# Patient Record
Sex: MALE | Race: WHITE | NOT HISPANIC OR LATINO | Employment: FULL TIME | URBAN - METROPOLITAN AREA
[De-identification: names, ages, dates, MRNs, and addresses within clinical notes are randomized per-mention and may not be internally consistent; named-entity substitution may affect disease eponyms.]

---

## 2020-12-01 ENCOUNTER — AMBULATORY - HEALTHEAST (OUTPATIENT)
Dept: FAMILY MEDICINE | Facility: CLINIC | Age: 50
End: 2020-12-01

## 2020-12-01 ENCOUNTER — VIRTUAL VISIT (OUTPATIENT)
Dept: FAMILY MEDICINE | Facility: OTHER | Age: 50
End: 2020-12-01

## 2020-12-01 DIAGNOSIS — Z20.822 SUSPECTED 2019 NOVEL CORONAVIRUS INFECTION: ICD-10-CM

## 2020-12-02 ENCOUNTER — AMBULATORY - HEALTHEAST (OUTPATIENT)
Dept: FAMILY MEDICINE | Facility: CLINIC | Age: 50
End: 2020-12-02

## 2020-12-02 DIAGNOSIS — Z20.822 SUSPECTED 2019 NOVEL CORONAVIRUS INFECTION: ICD-10-CM

## 2020-12-04 ENCOUNTER — COMMUNICATION - HEALTHEAST (OUTPATIENT)
Dept: SCHEDULING | Facility: CLINIC | Age: 50
End: 2020-12-04

## 2021-05-25 ENCOUNTER — RECORDS - HEALTHEAST (OUTPATIENT)
Dept: ADMINISTRATIVE | Facility: CLINIC | Age: 51
End: 2021-05-25

## 2023-01-14 ENCOUNTER — APPOINTMENT (OUTPATIENT)
Dept: RADIOLOGY | Facility: CLINIC | Age: 53
End: 2023-01-14
Attending: PHYSICIAN ASSISTANT
Payer: OTHER MISCELLANEOUS

## 2023-01-14 ENCOUNTER — HOSPITAL ENCOUNTER (EMERGENCY)
Facility: CLINIC | Age: 53
Discharge: HOME | End: 2023-01-14
Admitting: PHYSICIAN ASSISTANT
Payer: OTHER MISCELLANEOUS

## 2023-01-14 VITALS
WEIGHT: 240 LBS | TEMPERATURE: 97.7 F | DIASTOLIC BLOOD PRESSURE: 77 MMHG | HEART RATE: 64 BPM | SYSTOLIC BLOOD PRESSURE: 134 MMHG | HEIGHT: 72 IN | BODY MASS INDEX: 32.51 KG/M2 | RESPIRATION RATE: 16 BRPM | OXYGEN SATURATION: 97 %

## 2023-01-14 DIAGNOSIS — S49.91XA SHOULDER INJURY, RIGHT, INITIAL ENCOUNTER: ICD-10-CM

## 2023-01-14 PROCEDURE — 73030 X-RAY EXAM OF SHOULDER: CPT | Mod: RT

## 2023-01-14 PROCEDURE — 99283 EMERGENCY DEPT VISIT LOW MDM: CPT

## 2023-01-14 ASSESSMENT — ENCOUNTER SYMPTOMS
NEUROLOGICAL NEGATIVE: 1
GASTROINTESTINAL NEGATIVE: 1
CARDIOVASCULAR NEGATIVE: 1
RESPIRATORY NEGATIVE: 1

## 2023-01-14 NOTE — Clinical Note
Avi Castro was seen and treated in our emergency department on 1/14/2023.  He may return to work on 01/23/2023.  Currently your next work date is Wednesday the 18th, however if you still are having persistent pain and do not feel that he can you can do your job please take my recommendation is for excusing him until the 23rd.     If you have any questions or concerns, please don't hesitate to call.      Cyril Myrick, JARAD

## 2023-01-14 NOTE — ED PROVIDER NOTES
EMERGENCY DEPARTMENT ENCOUNTER      NAME: Avi Castro  AGE: 52 year old male  YOB: 1970  MRN: [id number]  EVALUATION DATE & TIME: No admission date for patient encounter.    PCP: Mushtaq Purvis    ED PROVIDER: Cyril Myrick PA-C      Chief Complaint   Patient presents with     Shoulder Injury     Right          FINAL IMPRESSION:  1. Shoulder injury, right, initial encounter          MEDICAL DECISION MAKING:    Pertinent Labs & Imaging studies reviewed. (See chart for details)  52 year old male presents to the Emergency Department for evaluation of right shoulder pain after injury.    After obtaining history present illness, reviewing vital signs and examining the patient plan to screen with an x-ray.  I suspect soft tissue injury.    Medical Decision Making    History:    Supplemental history from: Documented in HPI, if applicable    External Record(s) reviewed: Documented in HPI, if applicable.    Work Up:    Chart documentation includes differential considered and any EKGs or imaging independently interpreted by provider.    In additional to work up documented, I considered the following work up: See chart documentation, if applicable.    External consultation:    Discussion of management with another provider: See chart documentation, if applicable    Complicating factors:    Care impacted by chronic illness: N/A    Care affected by social determinants of health: N/A    Disposition considerations: Discharge. No recommendations on prescription strength medication(s). N/A.        X-rays negative for fracture or dislocation.  Patient's pain is likely related to cartilage versus tendon or ligament issue.  Recommend conservative management the form of immobilization ice and NSAIDs.  If the symptoms are persisting greater than 2 weeks I recommend follow-up through an orthopedic specialist where they likely would consider outpatient MRI.  Overall patient agreeable to plan of care.  I did provide  the patient a work note.  He is not scheduled to go back to work until Wednesday, however I did write for extended period of time off if he still feels that he cannot perform his job as he works as a .  Overall patient agreeable with plan.    ED COURSE    I met with the patient, obtained history, performed an initial exam, and discussed options and plan for diagnostics and treatment here in the ED.    At the conclusion of the encounter I discussed the results of all of the tests and the disposition. The questions were answered. The patient or family acknowledged understanding and was agreeable with the care plan.     MEDICATIONS GIVEN IN THE EMERGENCY:  Medications - No data to display    NEW PRESCRIPTIONS STARTED AT TODAY'S ER VISIT  New Prescriptions    No medications on file            =================================================================    HPI    Patient information was obtained from: Patient  Avi Castro is a 52 year old male who presents to this ED for evaluation of right shoulder pain.  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  He was walking down the driveway and there was a very small piece of ice that he slipped on causing him to fall towards his back landing on his right shoulder and right arm.  He did have instant pain but he was able to finish his shift and since then he has been using ibuprofen Tylenol and ice to treat symptoms.  He is continue to have pain most with attempts to lift his arm up over his head.  He does describe some radiating pain from right shoulder down into the aspects of the arm closer to the elbow.  He denies any numbness tingling or weakness in the right hand.  No complaints of neck pain.  He did not hit his head and he did not lose consciousness.  He denies any acute chest pain or shortness of breath.  He denies any coughing up blood, or urinating blood.      REVIEW OF SYSTEMS   Review  of Systems   Respiratory: Negative.    Cardiovascular: Negative.    Gastrointestinal: Negative.    Genitourinary: Negative.    Musculoskeletal:        Pain in the right shoulder   Neurological: Negative.    All other systems reviewed and are negative.         PAST MEDICAL HISTORY:  No past medical history on file.    PAST SURGICAL HISTORY:  No past surgical history on file.      CURRENT MEDICATIONS:    No current facility-administered medications for this encounter.  No current outpatient medications on file.      ALLERGIES:  No Known Allergies    FAMILY HISTORY:  No family history on file.    SOCIAL HISTORY:   Social History     Socioeconomic History     Marital status:        VITALS:  Patient Vitals for the past 24 hrs:   BP Temp Temp src Pulse Resp SpO2 Height Weight   01/14/23 1325 134/77 97.7  F (36.5  C) Oral 64 16 97 % 1.829 m (6') 108.9 kg (240 lb)       PHYSICAL EXAM    Physical Exam  Vitals and nursing note reviewed.   Constitutional:       General: He is not in acute distress.     Appearance: He is normal weight. He is not ill-appearing.   HENT:      Head: Normocephalic.      Right Ear: External ear normal.      Left Ear: External ear normal.      Nose: Nose normal.   Eyes:      Conjunctiva/sclera: Conjunctivae normal.   Cardiovascular:      Rate and Rhythm: Normal rate.      Pulses: Normal pulses.   Pulmonary:      Effort: Pulmonary effort is normal. No respiratory distress.   Musculoskeletal:         General: Normal range of motion.      Cervical back: Normal range of motion.      Comments: Patient localizes pain to the general right shoulder area.  No palpable deformity.  There is no tenderness to palpation of the right clavicle or right scapula.  Patient is able to flex and extend at the right elbow as well as right wrist without severe pain.  Distal radial ulnar pulses intact involving the right upper extremity.  Patient is able to internally and externally rotate without severe pain.   Attempts to place his right arm up in an overhead position is what causes him the most pain so basically shoulder abduction in the anterior and lateral positions are causing him pain in the shoulder itself.   Skin:     General: Skin is warm.      Findings: No bruising or rash.   Neurological:      General: No focal deficit present.      Mental Status: He is alert. Mental status is at baseline.      Sensory: No sensory deficit.      Motor: No weakness.          LAB:  All pertinent labs reviewed and interpreted.  Results for orders placed or performed during the hospital encounter of 01/14/23   XR Shoulder Right 2 Views    Impression    IMPRESSION: Normal joint spaces and alignment. No fracture.       RADIOLOGY:  Reviewed all pertinent imaging. Please see official radiology report.  XR Shoulder Right 2 Views   Final Result   IMPRESSION: Normal joint spaces and alignment. No fracture.            Cyril Myrick PA-C  Emergency Medicine  [facility]     Cyril Myrick PA-C  01/14/23 1427

## 2023-01-14 NOTE — DISCHARGE INSTRUCTIONS
I would recommend that you use ibuprofen and Tylenol as needed for discomfort.  I would wear the sling if this offers you comfort and relief.  Please ice over the affected joint is much as possible.  Please follow-up through an orthopedic specialist in 2 weeks if your symptoms persist.  Your x-rays today are negative for acute fracture.  Certainly soft tissue injury involving cartilage or ligaments/tendons is possible.

## 2024-11-05 NOTE — ED TRIAGE NOTES
Fall on ice on 1/12/23. Patient continues to have pain in the right shoulder- denies other shoulder injuries. Patient denies Hitting his head, no LOC, no thinners.      Triage Assessment     Row Name 01/14/23 1323       Triage Assessment (Adult)    Airway WDL WDL       Respiratory WDL    Respiratory WDL WDL       Skin Circulation/Temperature WDL    Skin Circulation/Temperature WDL WDL       Cardiac WDL    Cardiac WDL WDL       Peripheral/Neurovascular WDL    Peripheral Neurovascular WDL WDL       Cognitive/Neuro/Behavioral WDL    Cognitive/Neuro/Behavioral WDL WDL               Fwd to Dr. Ricketts's team    Patient is scheduled for Robotic assisted right total hip arthroplasty, posterior approach with Dr. Owens on 2-4-25.    Please contact patient to schedule an appointment for a pre-op H&P during the week of 1-6-25 or 1-13-25    Testing requested: CBC/CMP (Random)     EKG    Thank you.